# Patient Record
Sex: FEMALE | Race: WHITE | Employment: OTHER | URBAN - METROPOLITAN AREA
[De-identification: names, ages, dates, MRNs, and addresses within clinical notes are randomized per-mention and may not be internally consistent; named-entity substitution may affect disease eponyms.]

---

## 2020-01-23 ENCOUNTER — OFFICE VISIT (OUTPATIENT)
Dept: NEUROLOGY | Age: 75
End: 2020-01-23
Payer: COMMERCIAL

## 2020-01-23 VITALS
HEIGHT: 60 IN | WEIGHT: 93 LBS | SYSTOLIC BLOOD PRESSURE: 114 MMHG | BODY MASS INDEX: 18.26 KG/M2 | HEART RATE: 100 BPM | DIASTOLIC BLOOD PRESSURE: 80 MMHG | RESPIRATION RATE: 18 BRPM | OXYGEN SATURATION: 98 %

## 2020-01-23 PROCEDURE — G8427 DOCREV CUR MEDS BY ELIG CLIN: HCPCS | Performed by: PSYCHIATRY & NEUROLOGY

## 2020-01-23 PROCEDURE — G8419 CALC BMI OUT NRM PARAM NOF/U: HCPCS | Performed by: PSYCHIATRY & NEUROLOGY

## 2020-01-23 PROCEDURE — 1090F PRES/ABSN URINE INCON ASSESS: CPT | Performed by: PSYCHIATRY & NEUROLOGY

## 2020-01-23 PROCEDURE — 99205 OFFICE O/P NEW HI 60 MIN: CPT | Performed by: PSYCHIATRY & NEUROLOGY

## 2020-01-23 PROCEDURE — G8484 FLU IMMUNIZE NO ADMIN: HCPCS | Performed by: PSYCHIATRY & NEUROLOGY

## 2020-01-23 NOTE — PROGRESS NOTES
denied weakness in any limb, clumsiness, incontinence, swallowing or chewing problems, visual abnormalities, speech difficulties, spinning sensations, hearing loss, headaches, other pains or loss of consciousness. She reported caring for her household without issues. She drove only short distances, and was never involved in a motor vehicular crashes or citations. Most of her children lived close by. She also denied any medical issues, as chest pain, shortness of breath, sweating, abdominal discomforts, chills, fevers, tripping, or falling. Physical examination displayed stable vital signs. She was an elderly woman in no acute distress, who was alert, cooperative and oriented. She was very pleasant. She answered all questions slowly and at times inaccurately. She was very thin and of short stature. Her skin was unremarkable with no lymphadenopathy. Head examination was unremarkable. Her neck was supple without thyromegaly; there were +1 carotid upstrokes without bruits; there was minimally limited range of motion of her neck in all directions. Her spine revealed minimal gibbus deformity, without tenderness. Her lungs were clear to auscultation. Cardiac examination was unrevealing. Her abdomen was scaphoid, without lesions. Peripheral pulses were decreased throughout, without bruits. Her limbs were thin, with minimal degenerative deformities in her knees bilateral hammertoes. Straight leg raising was unremarkable. Neurological examination revealed defects in all phases of memory. She stated she was 73, instead of 74. She is uncertain of all her children's names, as well as her grandchildren's names. She did not know who the president was. There were no dysarthrias or aphasias. Cranial nerve testing was unremarkable. I found decreased bulk throughout with normal tone. I found 5/5 strength throughout, without abnormal movements. There were no reflexes.   I found a suck and bilateral

## 2020-01-27 ENCOUNTER — TELEPHONE (OUTPATIENT)
Dept: NEUROLOGY | Age: 75
End: 2020-01-27

## 2020-03-12 ENCOUNTER — TELEPHONE (OUTPATIENT)
Dept: NEUROLOGY | Age: 75
End: 2020-03-12

## 2020-03-12 NOTE — TELEPHONE ENCOUNTER
Called pt. Today to ask her why she did not have her MRI done. She states she did not know about it. I informed her she missed her appt on 1/31/2019. I called scheduling at [facility] and they have called her 3 times. I gave pt the phone number to scheduling and told her she must call.  The MRI is auth till 3/23/2020

## 2020-03-17 ENCOUNTER — HOSPITAL ENCOUNTER (OUTPATIENT)
Dept: GENERAL RADIOLOGY | Age: 75
Discharge: HOME | End: 2020-03-19
Payer: COMMERCIAL

## 2020-03-17 PROCEDURE — 77063 BREAST TOMOSYNTHESIS BI: CPT

## 2020-05-28 ENCOUNTER — TELEPHONE (OUTPATIENT)
Dept: NEUROLOGY | Age: 75
End: 2020-05-28

## 2020-05-28 NOTE — TELEPHONE ENCOUNTER
New PA completed for MRI brain wo Contrast , Approved auth #[id number]. Valid May 28 to Nov 24. Tracking # [id number]. Scheduled June 6 Saturday ,nilda 8:30am  test begins at [facility]. LM on voice mail for pt of date and time.

## 2020-06-08 ENCOUNTER — TELEPHONE (OUTPATIENT)
Dept: NEUROLOGY | Age: 75
End: 2020-06-08

## 2020-06-08 NOTE — TELEPHONE ENCOUNTER
Pt did not show for MRI Brain wo contrast. LM for pt to call office or schedule to reschedule MRI brain. Both numbers left on answering machine.

## 2020-06-30 ENCOUNTER — TELEPHONE (OUTPATIENT)
Dept: NEUROLOGY | Age: 75
End: 2020-06-30

## 2020-09-08 NOTE — TELEPHONE ENCOUNTER
Thank you Where skin visible -- no rashes, no atypical discoloration, no jaundice present , normal turgor

## 2020-09-09 ENCOUNTER — TELEPHONE (OUTPATIENT)
Dept: ADMINISTRATIVE | Age: 75
End: 2020-09-09

## 2020-09-09 ENCOUNTER — TELEPHONE (OUTPATIENT)
Dept: NEUROLOGY | Age: 75
End: 2020-09-09

## 2020-09-09 NOTE — TELEPHONE ENCOUNTER
Pt called about follow up with Dr. Mauricio Eldridge. Per Keven Cristobal, pt was given phone number to reschedule MRI that was recommended. Pt verbalized understanding and states she will call them. She was encouraged to call us back with any difficulties.

## 2020-09-09 NOTE — TELEPHONE ENCOUNTER
Returned call to patient to set up follow up w/Dr Bobetta Seip, MD  Electronically signed by Donte Vazquez on 9/9/20 at 8:04 AM EDT

## 2020-09-16 ENCOUNTER — HOSPITAL ENCOUNTER (OUTPATIENT)
Dept: MRI IMAGING | Age: 75
Discharge: HOME | End: 2020-09-18
Payer: COMMERCIAL

## 2020-09-16 PROCEDURE — 70551 MRI BRAIN STEM W/O DYE: CPT

## 2020-09-29 ENCOUNTER — TELEPHONE (OUTPATIENT)
Dept: NEUROLOGY | Age: 75
End: 2020-09-29

## 2020-09-29 NOTE — TELEPHONE ENCOUNTER
Please inform the daughter that the MRI reveals only minimal aging changes. Therefore, she likely suffers from Alzheimer's disease, which displays the above MRI findings. If she has additional questions, please have her call back.   Thank you

## 2020-09-29 NOTE — TELEPHONE ENCOUNTER
Patient's daughter called in today. She would like results, very concerned. Her mom was told that something showed. Please call Praveen Arrieta at [phone].   Electronically signed by Yolande Bumpers, MA on 9/29/20 at 3:37 PM EDT

## 2020-09-30 NOTE — TELEPHONE ENCOUNTER
MA called daughter regarding MRI results and read her Dr Hennessy's response. She very grateful but still had questions and wants to speak to you. [facility] ph # [phone].   Electronically signed by Kenia Doll MA on 9/30/20 at 9:36 AM EDT

## 2020-10-08 ENCOUNTER — OFFICE VISIT (OUTPATIENT)
Dept: NEUROLOGY | Age: 75
End: 2020-10-08
Payer: COMMERCIAL

## 2020-10-08 VITALS
SYSTOLIC BLOOD PRESSURE: 128 MMHG | WEIGHT: 94.5 LBS | HEART RATE: 92 BPM | TEMPERATURE: 97.8 F | DIASTOLIC BLOOD PRESSURE: 75 MMHG | HEIGHT: 60 IN | BODY MASS INDEX: 18.55 KG/M2 | OXYGEN SATURATION: 96 %

## 2020-10-08 PROCEDURE — 99215 OFFICE O/P EST HI 40 MIN: CPT | Performed by: PSYCHIATRY & NEUROLOGY

## 2020-10-08 PROCEDURE — 3017F COLORECTAL CA SCREEN DOC REV: CPT | Performed by: PSYCHIATRY & NEUROLOGY

## 2020-10-08 PROCEDURE — 4040F PNEUMOC VAC/ADMIN/RCVD: CPT | Performed by: PSYCHIATRY & NEUROLOGY

## 2020-10-08 PROCEDURE — G8400 PT W/DXA NO RESULTS DOC: HCPCS | Performed by: PSYCHIATRY & NEUROLOGY

## 2020-10-08 PROCEDURE — 4004F PT TOBACCO SCREEN RCVD TLK: CPT | Performed by: PSYCHIATRY & NEUROLOGY

## 2020-10-08 PROCEDURE — 1123F ACP DISCUSS/DSCN MKR DOCD: CPT | Performed by: PSYCHIATRY & NEUROLOGY

## 2020-10-08 PROCEDURE — G8427 DOCREV CUR MEDS BY ELIG CLIN: HCPCS | Performed by: PSYCHIATRY & NEUROLOGY

## 2020-10-08 PROCEDURE — G8484 FLU IMMUNIZE NO ADMIN: HCPCS | Performed by: PSYCHIATRY & NEUROLOGY

## 2020-10-08 PROCEDURE — G8420 CALC BMI NORM PARAMETERS: HCPCS | Performed by: PSYCHIATRY & NEUROLOGY

## 2020-10-08 PROCEDURE — 1090F PRES/ABSN URINE INCON ASSESS: CPT | Performed by: PSYCHIATRY & NEUROLOGY

## 2020-10-08 RX ORDER — DONEPEZIL HYDROCHLORIDE 5 MG/1
TABLET, FILM COATED ORAL
Qty: 30 TABLET | Refills: 0 | Status: SHIPPED
Start: 2020-10-08 | End: 2020-11-02

## 2020-10-08 RX ORDER — DONEPEZIL HYDROCHLORIDE 10 MG/1
10 TABLET, FILM COATED ORAL NIGHTLY
Qty: 30 TABLET | Refills: 11 | Status: SHIPPED | OUTPATIENT
Start: 2020-10-08 | End: 2021-10-08

## 2020-10-08 NOTE — PROGRESS NOTES
This 43-year-old left-handed woman was referred by her family physician for evaluation and management of cognitive difficulties. She remained a poor historian. Fortunately, her daughter was present for the interview and was a good historian. Her medications were omeprazole, sucralfate, Questran and multivitamins. The patient denied hypertension, diabetes mellitus, strokes, seizures, heart disease, lung disorders, connective tissue disease, cancers, skin abnormalities or depression. She was on omeprazole for presumed gastroesophageal reflux disease. She was on multivitamins due to her poor eating habits. The patient was referred to this office after her family physician noted memory issues. The patient claimed to have failed a brief memory test, but could provide no details. Unfortunately, she felt she did not have memory issues and had little insight into her disorder. There was weakness in any limb, clumsiness, incontinence, swallowing or chewing problems, visual abnormalities, speech difficulties, spinning sensations, hearing loss, headaches, other pains or loss of consciousness. She was subsequently diagnosed with dementia. An MRI of her brain revealed minimal small vessel disease and, therefore, the diagnosis was Alzheimer's disease. Her daughter noted continued decline and was willing to try memory agents. She was again eating poorly, still losing weight. Her daughter denied other neurological issues. Her daughter also reported no medical issues, as chest pain, shortness of breath, sweating, abdominal discomforts, chills, fevers, tripping, or falling. Again she was eating erratically. She was sleeping moderately well. Unfortunately, she was still driving. Fortunately, there were no accidents. She continued to smoke 5 cigarettes daily. Review of systems was otherwise unremarkable. Physical examination displayed stable vital signs.   She was an elderly woman in no acute distress, who was alert, cooperative and oriented. She remained pleasant. She answered all questions slowly and at times inaccurately. She continued to be very thin and of short stature. Her skin was unremarkable with no lymphadenopathy. Her spine revealed minimal gibbus deformity, without tenderness. Her lungs were clear to auscultation. Cardiac examination was unrevealing. Her abdomen was scaphoid, without lesions. Peripheral pulses were decreased throughout, without bruits. Her limbs were thin, with minimal degenerative deformities in her knees and bilateral hammertoes. Neurological examination revealed defects in all phases of memory. She remained uncertain of all her children's names, as well as her grandchildren's names. She did not know who the president was. There were no dysarthrias or aphasias. Cranial nerve testing was unremarkable. I found decreased bulk throughout with normal tone. There was 5/5 strength throughout. There were no reflexes. I found a suck and bilateral grasp reflexes. There remained no r pathological reflexes. Light touch and pinprick were intact. Vibration was reduced above both knees. Coordination testing revealed no ataxia. She walked well. Her MRI was noted above. This individual presents with cognitive issues in all spheres. She also displays frontal lobe release signs. She suffers from late onset Alzheimer's disease. She requires disease memory agents. I will first start with donepezil and then add Namenda. Fortunately, there remain no focal neurological deficits. I recommended not driving. I again question her ability to live alone. She is very thin, eating minimally. I agree with multivitamins and supplements at this time. She also must stop smoking. She will return in 3 months. Blood testing was still in order. She was begun on donepezil 5 mg at night for 1 month, then 10 mg nightly. In 2 months, Namenda will be added.   Her daughter will report back at that time. I again recommended her not driving for now. Her daughter will report back if problems arise at any time. I spent 40 minutes with the patient with over 50 % spent in counseling and disease management. All patient issues were addressed and all questions were answered.

## 2020-11-02 RX ORDER — DONEPEZIL HYDROCHLORIDE 5 MG/1
TABLET, FILM COATED ORAL
Qty: 30 TABLET | Refills: 0 | Status: SHIPPED | OUTPATIENT
Start: 2020-11-02

## 2020-11-10 ENCOUNTER — OFFICE VISIT (OUTPATIENT)
Dept: NEUROLOGY | Age: 75
End: 2020-11-10
Payer: COMMERCIAL

## 2020-11-10 VITALS
SYSTOLIC BLOOD PRESSURE: 123 MMHG | WEIGHT: 89 LBS | TEMPERATURE: 97 F | RESPIRATION RATE: 18 BRPM | BODY MASS INDEX: 17.38 KG/M2 | DIASTOLIC BLOOD PRESSURE: 84 MMHG | HEART RATE: 73 BPM | OXYGEN SATURATION: 99 %

## 2020-11-10 PROCEDURE — 4004F PT TOBACCO SCREEN RCVD TLK: CPT | Performed by: PSYCHIATRY & NEUROLOGY

## 2020-11-10 PROCEDURE — 3017F COLORECTAL CA SCREEN DOC REV: CPT | Performed by: PSYCHIATRY & NEUROLOGY

## 2020-11-10 PROCEDURE — G8419 CALC BMI OUT NRM PARAM NOF/U: HCPCS | Performed by: PSYCHIATRY & NEUROLOGY

## 2020-11-10 PROCEDURE — G8484 FLU IMMUNIZE NO ADMIN: HCPCS | Performed by: PSYCHIATRY & NEUROLOGY

## 2020-11-10 PROCEDURE — G8428 CUR MEDS NOT DOCUMENT: HCPCS | Performed by: PSYCHIATRY & NEUROLOGY

## 2020-11-10 PROCEDURE — 99215 OFFICE O/P EST HI 40 MIN: CPT | Performed by: PSYCHIATRY & NEUROLOGY

## 2020-11-10 PROCEDURE — 4040F PNEUMOC VAC/ADMIN/RCVD: CPT | Performed by: PSYCHIATRY & NEUROLOGY

## 2020-11-10 PROCEDURE — 1090F PRES/ABSN URINE INCON ASSESS: CPT | Performed by: PSYCHIATRY & NEUROLOGY

## 2020-11-10 PROCEDURE — 1123F ACP DISCUSS/DSCN MKR DOCD: CPT | Performed by: PSYCHIATRY & NEUROLOGY

## 2020-11-10 PROCEDURE — G8400 PT W/DXA NO RESULTS DOC: HCPCS | Performed by: PSYCHIATRY & NEUROLOGY

## 2020-11-10 RX ORDER — MEMANTINE HYDROCHLORIDE 10 MG/1
10 TABLET ORAL 2 TIMES DAILY
Qty: 60 TABLET | Refills: 11 | Status: SHIPPED | OUTPATIENT
Start: 2020-11-10

## 2020-11-10 RX ORDER — MEMANTINE HYDROCHLORIDE 5 MG/1
TABLET ORAL
Qty: 42 TABLET | Refills: 0 | Status: SHIPPED
Start: 2020-11-10 | End: 2020-12-21

## 2020-11-10 RX ORDER — METHIMAZOLE 5 MG/1
1 TABLET ORAL DAILY
COMMUNITY
Start: 2020-11-03

## 2020-11-10 NOTE — PROGRESS NOTES
This 70-year-old left-handed woman was referred by her family physician for evaluation and management of cognitive difficulties. She remained a poor historian. Her daughter return for this interview and continued to be a good historian. Her medications were now donepezil, Tapazole, omeprazole, sucralfate and multivitamins. She was on omeprazole for presumed gastroesophageal reflux disease. She was on multivitamins due to her poor eating habits. The patient was referred to this office after her family physician noted memory issues. She was then diagnosed with Alzheimer's disease. An MRI of her brain revealed minimal small vessel disease. Elizabeth Stubbsgo Her daughter noted continued decline despite using donepezil 10 mg at night. She requested additional agents. She was still eating poorly, and losing weight more weight. Her daughter denied other neurological issues. Her daughter also reported no medical issues, as chest pain, shortness of breath, sweating, abdominal discomforts, chills, fevers, tripping, or falling. She was sleeping moderately well. Unfortunately, she was still driving. Fortunately, there were no accidents. She continued to smoke 5 cigarettes daily. She continued living alone. Review of systems was otherwise unremarkable. Physical examination displayed stable vital signs. She was an elderly woman in no acute distress, who was alert, cooperative and oriented. She remained pleasant. She answered all questions slowly and at times inaccurately. She she was now extremely thin, weighing 89 pounds. Her skin was unremarkable. Her spine revealed minimal gibbus deformity, without tenderness. Her lungs were clear to auscultation. Cardiac testing was unrevealing. Peripheral pulses were decreased throughout, without bruits. Her limbs were thin, with minimal degenerative deformities in her knees and bilateral hammertoes.     Neurological examination revealed defects in all phases of memory. She remained uncertain of her children's names, as well as her grandchildren's. She did not know who the president was. There were no dysarthrias or aphasias. Cranial nerve testing was unremarkable. I found decreased bulk with normal tone. There was 5/5 strength throughout. There were no reflexes. I found no suck today but again bilateral grasp reflexes. Light touch and pinprick were intact. Vibration was reduced above both knees. Coordination testing revealed no ataxia. She walked well. Her MRI was noted above. This individual presents with cognitive issues in all spheres. She also displays frontal lobe release signs. She suffers from late onset Alzheimer's disease. She continues her decline. Loral Duc will be started as well. Fortunately, there remain no focal neurological deficits. I recommended not driving. I again question her ability to live alone. She is eating poorly. The family members needs to watch her eat 3 times daily. I also recommended supplements that she enjoys. She also must stop smoking. She will return in 5 months. Namenda was added to her titration. Her daughter will report back in 2 months. Her daughter will call at any time if problems arise. The family will again consider assisted living placement. Once again I recommend her not driving. I spent 40 minutes with the patient with over 50 % spent in counseling and disease management. All patient issues were addressed and all questions were answered.

## 2020-11-11 ENCOUNTER — OFFICE VISIT (OUTPATIENT)
Dept: ENDOCRINOLOGY | Age: 75
End: 2020-11-11
Payer: COMMERCIAL

## 2020-11-11 VITALS
BODY MASS INDEX: 17.58 KG/M2 | HEART RATE: 83 BPM | SYSTOLIC BLOOD PRESSURE: 138 MMHG | OXYGEN SATURATION: 94 % | TEMPERATURE: 98.7 F | WEIGHT: 90 LBS | DIASTOLIC BLOOD PRESSURE: 70 MMHG

## 2020-11-11 PROCEDURE — 1123F ACP DISCUSS/DSCN MKR DOCD: CPT | Performed by: INTERNAL MEDICINE

## 2020-11-11 PROCEDURE — G8419 CALC BMI OUT NRM PARAM NOF/U: HCPCS | Performed by: INTERNAL MEDICINE

## 2020-11-11 PROCEDURE — 3017F COLORECTAL CA SCREEN DOC REV: CPT | Performed by: INTERNAL MEDICINE

## 2020-11-11 PROCEDURE — 4004F PT TOBACCO SCREEN RCVD TLK: CPT | Performed by: INTERNAL MEDICINE

## 2020-11-11 PROCEDURE — 1090F PRES/ABSN URINE INCON ASSESS: CPT | Performed by: INTERNAL MEDICINE

## 2020-11-11 PROCEDURE — 99204 OFFICE O/P NEW MOD 45 MIN: CPT | Performed by: INTERNAL MEDICINE

## 2020-11-11 PROCEDURE — G8427 DOCREV CUR MEDS BY ELIG CLIN: HCPCS | Performed by: INTERNAL MEDICINE

## 2020-11-11 PROCEDURE — 4040F PNEUMOC VAC/ADMIN/RCVD: CPT | Performed by: INTERNAL MEDICINE

## 2020-11-11 PROCEDURE — G8484 FLU IMMUNIZE NO ADMIN: HCPCS | Performed by: INTERNAL MEDICINE

## 2020-11-11 PROCEDURE — G8400 PT W/DXA NO RESULTS DOC: HCPCS | Performed by: INTERNAL MEDICINE

## 2020-11-11 NOTE — PROGRESS NOTES
Take 1 tablet by mouth 2 times daily To start in 3 weeks 60 tablet 11    memantine (NAMENDA) 5 MG tablet 5 mg daily for 1 week; then 5 mg twice daily for 1 week; then 10 mg a.m. and 5 mg p.m. for 1 week; then 10 mg twice daily 42 tablet 0    donepezil (ARICEPT) 5 MG tablet TAKE 1 TABLET (5MG) EVERY DAY IN THE EVENING FOR 1 MONTH ONLY 30 tablet 0    donepezil (ARICEPT) 10 MG tablet Take 1 tablet by mouth nightly To start in 1 month 30 tablet 11    Multiple Vitamins-Minerals (MULTIVITAMIN ADULTS PO) Take 1 tablet by mouth daily      omeprazole (PRILOSEC) 40 MG capsule Take 1 capsule by mouth daily. 30 capsule 3    sucralfate (CARAFATE) 1 GM tablet Take 1 tablet by mouth three times daily. 120 tablet 3     No current facility-administered medications for this visit. Review of Systems  Constitutional: No fever, no chills, no diaphoresis, no generalized weakness. HEENT: No blurred vision, No sore throat, no ear pain, no hair loss  Neck: denied any neck swelling, difficulty swallowing,   Cadrdiopulomary: No CP, SOB or palpitation, No orthopnea or PND. No cough or wheezing. GI: No N/V/D, no constipation, No abdominal pain, no melena or hematochezia   : Denied any dysuria, hematuria, flank pain, discharge, or incontinence. Skin: denied any rash, ulcer, Hirsute, or hyperpigmentation. MSK: denied any joint deformity, joint pain/swelling, muscle pain, or back pain.   Neuro: no numbess, no tingling, no weakness,     OBJECTIVE    /70 (Site: Left Upper Arm, Position: Sitting, Cuff Size: Medium Adult)   Pulse 83   Temp 98.7 °F (37.1 °C)   Wt 90 lb (40.8 kg)   SpO2 94%   BMI 17.58 kg/m²   BP Readings from Last 4 Encounters:   11/11/20 138/70   11/10/20 123/84   10/08/20 128/75   01/23/20 114/80     Wt Readings from Last 6 Encounters:   11/11/20 90 lb (40.8 kg)   11/10/20 89 lb (40.4 kg)   10/08/20 94 lb 8 oz (42.9 kg)   01/23/20 93 lb (42.2 kg)   05/30/14 112 lb (50.8 kg)   05/27/14 112 lb (50.8 kg) y. o.-old female seen in for management of following issues      Hyperthyroidism  · Thyroiditis vs graves disease vs toxic thyroid nodule(s)   · Currently on Methimazole 5 mg daily   · Check TSH, free T4, T3, TSI, TPO-Ab, Tg-Ab   · Obtain thyroid US   · Reviewed potential rare but serious side effects of Methimazole, including agranulocytosis and liver dysfunction (cholestasis). vitD deficiency    · Discussed the effect of hyperthyroidism on bone health  · Continue vitD supplement     Low bone density   · Only on VitD  · Obtain DEXA scan and proceed accordingly     Return in about 4 months (around 3/11/2021) for Hyperthyroidism . The above issues were reviewed with the patient who understood and agreed with the plan. Thank you for allowing us to participate in the care of this patient. Please do not hesitate to contact us with any additional questions. Diagnosis Orders   1. Osteoporosis, unspecified osteoporosis type, unspecified pathological fracture presence  Comprehensive Metabolic Panel    DEXA BONE DENSITY AXIAL SKELETON    Vitamin D 25 Hydroxy   2. Hyperthyroidism  TSH without Reflex    T4, Free    T4    T3    Thyroid AB    Thyroid Stimulating Immunoglobulin    US THYROID   3. Vitamin D deficiency  Vitamin D 25 Hydroxy       Conrad Pulliam MD  Endocrinologist, [facility]   [address] [place] [id number]   Phone: [phone]  Fax: [phone]  ---------------------------------  An electronic signature was used to authenticate this note.  Stacy Gregory MD on 11/11/2020 at 12:34 PM

## 2020-12-02 RX ORDER — DONEPEZIL HYDROCHLORIDE 5 MG/1
TABLET, FILM COATED ORAL
Qty: 30 TABLET | Refills: 0 | OUTPATIENT
Start: 2020-12-02

## 2020-12-21 RX ORDER — MEMANTINE HYDROCHLORIDE 5 MG/1
TABLET ORAL
Qty: 42 TABLET | Refills: 0 | Status: SHIPPED | OUTPATIENT
Start: 2020-12-21

## 2022-02-01 ENCOUNTER — TELEPHONE (OUTPATIENT)
Dept: ENDOCRINOLOGY | Age: 77
End: 2022-02-01

## 2022-02-01 NOTE — TELEPHONE ENCOUNTER
Dr Rosita Caldwell office wanted to know why we haven't schedule her I expalin she is a patient she needs to call so we can schedule her  She was scheduled for 7/19/2021 she no showed and on 3/18/2021 she canceled

## 2022-07-13 ENCOUNTER — OFFICE VISIT (OUTPATIENT)
Dept: NEUROLOGY | Age: 77
End: 2022-07-13
Payer: COMMERCIAL

## 2022-07-13 VITALS — TEMPERATURE: 98.1 F | HEART RATE: 70 BPM | DIASTOLIC BLOOD PRESSURE: 66 MMHG | SYSTOLIC BLOOD PRESSURE: 114 MMHG

## 2022-07-13 DIAGNOSIS — G30.1 LATE ONSET ALZHEIMER'S DISEASE WITHOUT BEHAVIORAL DISTURBANCE (HCC): Primary | ICD-10-CM

## 2022-07-13 DIAGNOSIS — F02.80 LATE ONSET ALZHEIMER'S DISEASE WITHOUT BEHAVIORAL DISTURBANCE (HCC): Primary | ICD-10-CM

## 2022-07-13 PROCEDURE — 99215 OFFICE O/P EST HI 40 MIN: CPT | Performed by: PSYCHIATRY & NEUROLOGY

## 2022-07-13 PROCEDURE — G8421 BMI NOT CALCULATED: HCPCS | Performed by: PSYCHIATRY & NEUROLOGY

## 2022-07-13 PROCEDURE — G8400 PT W/DXA NO RESULTS DOC: HCPCS | Performed by: PSYCHIATRY & NEUROLOGY

## 2022-07-13 PROCEDURE — 4004F PT TOBACCO SCREEN RCVD TLK: CPT | Performed by: PSYCHIATRY & NEUROLOGY

## 2022-07-13 PROCEDURE — G8427 DOCREV CUR MEDS BY ELIG CLIN: HCPCS | Performed by: PSYCHIATRY & NEUROLOGY

## 2022-07-13 PROCEDURE — 1090F PRES/ABSN URINE INCON ASSESS: CPT | Performed by: PSYCHIATRY & NEUROLOGY

## 2022-07-13 PROCEDURE — 1123F ACP DISCUSS/DSCN MKR DOCD: CPT | Performed by: PSYCHIATRY & NEUROLOGY

## 2022-07-13 NOTE — PROGRESS NOTES
This 51-year-old left-handed woman was referred by her family physician for evaluation and management of cognitive difficulties. She remained a poor historian. Another daughter attended this interview and provided a good history. Her medications were now low-dose donepezil, memantine, methimazole, omeprazole, sucralfate and multivitamins. She was on omeprazole for gastroesophageal reflux disease. She was on multivitamins due to her poor eating habits. She was eating better, with her children providing meals. The patient was referred after her family physician noted memory issues. She was diagnosed with Alzheimer's disease. MRI of her brain revealed minimal small vessel disease. Sherryle Moder Her daughter noted continued decline despite using donepezil 5 mg at night and memantine 10 mg twice daily. There were marked deficits in all phases of memory. She was unable to name her 1 daughter and all her grandchildren. She was eating better, gaining a few pounds since her last visi. Her daughter denied any focal neurological deficits. Her daughter reported no medical issues, as chest pain, shortness of breath, sweating, abdominal discomforts, chills, fevers, tripping, or falling. She was sleeping moderately well. She was no longer driving. She was smoking less. She continued living alone, with her children living close by. The family provided all her meals. Her house remained clean. She was not wandering. Review of systems was otherwise unremarkable. Physical examination displayed stable vital signs. Blood pressure was 114/66. She was an elderly woman in no acute distress, who was alert, cooperative and oriented to person and place. She displayed no insight into her disorder. She remained pleasant. She answered all questions slowly and almost always inaccurately. She she was no thinner, but well-dressed. Her skin was unremarkable.   Her spine revealed minimal gibbus deformity, without tenderness. Her lungs were clear to auscultation. Cardiac testing was unrevealing. Peripheral pulses were decreased throughout, without bruits. Her limbs were thin, with minimal degenerative deformities in her knees and bilateral hammertoes. Neurological examination revealed profound defects in all phases of memory. She remained uncertain of her children's names, as well as her grandchildren's. She did not know the president. There were no dysarthrias or aphasias. Cranial nerve testing was unremarkable. I found decreased bulk with normal tone. There was 5/5 strength throughout. There were no reflexes. There was no suck today but bilateral grasp reflexes. Light touch and pinprick continued intact. Vibration was reduced above both knees. Coordination testing revealed no ataxia. She walked well. There were no recent laboratory data. This individual presents with cognitive issues in all spheres. She again displays frontal lobe release signs. She suffers from late onset Alzheimer's disease. She continues her decline, despite dual memory agents. Fortunately, there remain no focal neurological deficits. Fortunately, she is not driving. She is living alone with marked help, but I question how long she will be able to do so. She is eating better. Hopefully, she will stop smoking. She will return in 6 months, to see Elidia Dakin. No medication changes were made. Her family will call at any time if problems arise. The family will again consider assisted living placement. I spent 40 minutes with the patient with over 50 % spent in counseling and disease management. All patient issues were addressed and all questions were answered.

## 2023-01-27 ENCOUNTER — HOSPITAL ENCOUNTER (EMERGENCY)
Age: 78
Discharge: HOME | End: 2023-01-27
Payer: COMMERCIAL

## 2023-01-27 VITALS
SYSTOLIC BLOOD PRESSURE: 137 MMHG | WEIGHT: 98 LBS | HEART RATE: 88 BPM | OXYGEN SATURATION: 96 % | TEMPERATURE: 98.5 F | RESPIRATION RATE: 17 BRPM | BODY MASS INDEX: 18.03 KG/M2 | HEIGHT: 62 IN | DIASTOLIC BLOOD PRESSURE: 66 MMHG

## 2023-01-27 DIAGNOSIS — R21 RASH AND OTHER NONSPECIFIC SKIN ERUPTION: ICD-10-CM

## 2023-01-27 DIAGNOSIS — T78.40XA ALLERGIC REACTION, INITIAL ENCOUNTER: Primary | ICD-10-CM

## 2023-01-27 PROCEDURE — 99284 EMERGENCY DEPT VISIT MOD MDM: CPT

## 2023-01-27 PROCEDURE — 96372 THER/PROPH/DIAG INJ SC/IM: CPT

## 2023-01-27 PROCEDURE — 6370000000 HC RX 637 (ALT 250 FOR IP): Performed by: NURSE PRACTITIONER

## 2023-01-27 PROCEDURE — 6360000002 HC RX W HCPCS: Performed by: NURSE PRACTITIONER

## 2023-01-27 RX ORDER — TRIAMCINOLONE ACETONIDE 5 MG/G
CREAM TOPICAL
Qty: 45 G | Refills: 0 | Status: SHIPPED | OUTPATIENT
Start: 2023-01-27 | End: 2023-02-03

## 2023-01-27 RX ORDER — FAMOTIDINE 20 MG/1
20 TABLET, FILM COATED ORAL 2 TIMES DAILY
Qty: 14 TABLET | Refills: 0 | Status: SHIPPED | OUTPATIENT
Start: 2023-01-27 | End: 2023-02-03

## 2023-01-27 RX ORDER — PREDNISONE 10 MG/1
TABLET ORAL
Qty: 20 TABLET | Refills: 0 | Status: SHIPPED | OUTPATIENT
Start: 2023-01-27 | End: 2023-02-06

## 2023-01-27 RX ORDER — FAMOTIDINE 20 MG/1
20 TABLET, FILM COATED ORAL ONCE
Status: COMPLETED | OUTPATIENT
Start: 2023-01-27 | End: 2023-01-27

## 2023-01-27 RX ORDER — LORATADINE 10 MG/1
10 TABLET ORAL DAILY
Qty: 7 TABLET | Refills: 0 | Status: SHIPPED | OUTPATIENT
Start: 2023-01-27

## 2023-01-27 RX ORDER — DEXAMETHASONE SODIUM PHOSPHATE 4 MG/ML
10 INJECTION, SOLUTION INTRA-ARTICULAR; INTRALESIONAL; INTRAMUSCULAR; INTRAVENOUS; SOFT TISSUE ONCE
Status: COMPLETED | OUTPATIENT
Start: 2023-01-27 | End: 2023-01-27

## 2023-01-27 RX ORDER — CETIRIZINE HYDROCHLORIDE 10 MG/1
5 TABLET ORAL ONCE
Status: COMPLETED | OUTPATIENT
Start: 2023-01-27 | End: 2023-01-27

## 2023-01-27 RX ADMIN — FAMOTIDINE 20 MG: 20 TABLET, FILM COATED ORAL at 19:40

## 2023-01-27 RX ADMIN — DEXAMETHASONE SODIUM PHOSPHATE 10 MG: 4 INJECTION, SOLUTION INTRAMUSCULAR; INTRAVENOUS at 19:41

## 2023-01-27 RX ADMIN — CETIRIZINE HYDROCHLORIDE 5 MG: 10 TABLET, FILM COATED ORAL at 19:40

## 2023-01-28 NOTE — ED PROVIDER NOTES
Independent  HPI:  1/27/23, Time: 7:34 PM DAMIEN Alcantar is a 68 y.o. female presenting to the ED for rash noted on face. Patient presents to the emergency department with her daughter, daughter primarily providing history due to patient with dementia. Daughter reports that she went over to visit her and noticed that her face was already in. Patient was not able to clearly state what she put on her face but does appear to be allergic in nature. She denies any other areas of rash or hives. She denies any shortness of breath as well as no new meds, soaps, lotions or detergents as well as no new foods. Patient was not provided with anything prior to arrival.  Patient reports that it does feel itchy. Patient with airway intact, no wheezing no stridor  Review of Systems:   A complete review of systems was performed and pertinent positives and negatives are stated within HPI, all other systems reviewed and are negative.          --------------------------------------------- PAST HISTORY ---------------------------------------------  Past Medical History:  has a past medical history of Abdominal pain and CAD (coronary artery disease). Past Surgical History:  has a past surgical history that includes Cardiac surgery (2000); Cholecystectomy (2013); Colonoscopy; and Upper gastrointestinal endoscopy (05/30/2014). Social History:  reports that she has been smoking. She has a 7.00 pack-year smoking history. She has never used smokeless tobacco. She reports that she does not drink alcohol and does not use drugs. Family History: family history is not on file. The patients home medications have been reviewed. Allergies: Patient has no known allergies.     -------------------------------------------------- RESULTS -------------------------------------------------  All laboratory and radiology results have been personally reviewed by myself   LABS:  No results found for this visit on 01/27/23. RADIOLOGY:  Interpreted by Radiologist.  No orders to display       ------------------------- NURSING NOTES AND VITALS REVIEWED ---------------------------   The nursing notes within the ED encounter and vital signs as below have been reviewed. /66   Pulse 88   Temp 98.5 °F (36.9 °C) (Temporal)   Resp 17   Ht 5' 2\" (1.575 m)   Wt 98 lb (44.5 kg)   SpO2 96%   BMI 17.92 kg/m²   Oxygen Saturation Interpretation: Normal      ---------------------------------------------------PHYSICAL EXAM--------------------------------------      Constitutional/General: Alert and oriented x3, well appearing, non toxic in NAD  Head: Normocephalic and atraumatic  Eyes: PERRL, EOMI  Mouth: Oropharynx clear, handling secretions, no trismus  Neck: Supple, full ROM,   Pulmonary: Lungs clear to auscultation bilaterally, no wheezes, rales, or rhonchi. Not in respiratory distress  Cardiovascular:  Regular rate and rhythm, no murmurs, gallops, or rubs. 2+ distal pulses  Abdomen: Soft, non tender, non distended,   Extremities: Moves all extremities x 4. Warm and well perfused  Skin: warm and dry , skin across bridge of nose into bilateral cheeks erythematous as well as chin. No vesicle formation no streaking no drainage. Neurologic: GCS 15,  Psych: Normal Affect      ------------------------------ ED COURSE/MEDICAL DECISION MAKING----------------------  Medications   dexamethasone (DECADRON) injection 10 mg (10 mg IntraMUSCular Given 1/27/23 1941)   famotidine (PEPCID) tablet 20 mg (20 mg Oral Given 1/27/23 1940)   cetirizine (ZYRTEC) tablet 5 mg (5 mg Oral Given 1/27/23 1940)         ED COURSE:       Medical Decision Making: Chinedu Batista is a 68 y.o. female presenting to the ED for rash noted on face. Patient presents to the emergency department with her daughter, daughter primarily providing history due to patient with dementia.   Daughter reports that she went over to visit her and noticed that her face was already in. Patient was not able to clearly state what she put on her face but does appear to be allergic in nature. She denies any other areas of rash or hives. She denies any shortness of breath as well as no new meds, soaps, lotions or detergents as well as no new foods. Patient was not provided with anything prior to arrival.  Patient reports that it does feel itchy. Patient with airway intact, no wheezing no stridor. Differential diagnosis includes allergic reaction versus contact dermatitis versus strep. .  On exam patient with lungs clear throughout, no wheezing no stridor. Will medicate her meds for symptom relief including Zyrtec 5 mg oral tablet. I chose Zyrtec over Benadryl due to her age and that she does live home alone but family states that they do live 2 doors down from her but giving Benadryl I did not want patient to become a fall risk when she was at home. We will also provide her with Pepcid 20 mg oral tablet as well as Decadron 10 mg IM injection. Patient then will be discharged with prescriptions for further symptom relief including Claritin, Pepcid, prednisone as well as Kenalog cream.  Family was educated to follow-up with patient's primary care doctor on Monday as well as strict return precautions and continued supportive measures and daughter encouraged to go to mother's house to look for any open tubes of medication or detergents or make-up that could have been the source. Patient's daughter expressed understanding. Patient will be safely discharged home       Counseling: The emergency provider has spoken with the patient and family member patient and daughter and discussed todays results, in addition to providing specific details for the plan of care and counseling regarding the diagnosis and prognosis. Questions are answered at this time and they are agreeable with the plan.       History from : Patient and patient's daughter and Medical records     Limitations to history : None    Chronic Conditions: has a past medical history of Abdominal pain and CAD (coronary artery disease). CONSULTS:    Discussion with Other Profesionals : None    Social Determinants :  reports that she has been smoking. She has a 7.00 pack-year smoking history. She has never used smokeless tobacco. She reports that she does not drink alcohol and does not use drugs. Records Reviewed : Source patient and Inpatient Notes epic medical records        Disposition Considerations (Tests not ordered but considered, Shared Decision Making, Pt Expectation of Test or Tx.):   Appropriate for outpatient management yes and Evaluation by myself and discharge recommended. I am the Primary Clinician of Record.    --------------------------------- IMPRESSION AND DISPOSITION ---------------------------------    IMPRESSION  1. Allergic reaction, initial encounter    2. Rash and other nonspecific skin eruption        DISPOSITION  Disposition: Discharge to home  Patient condition is good      NOTE: This report was transcribed using voice recognition software.  Every effort was made to ensure accuracy; however, inadvertent computerized transcription errors may be present      VALDEMAR Alfonso - CNP  01/28/23 0406

## 2023-02-08 ENCOUNTER — TELEPHONE (OUTPATIENT)
Dept: NEUROLOGY | Age: 78
End: 2023-02-08